# Patient Record
Sex: FEMALE | Race: WHITE | URBAN - METROPOLITAN AREA
[De-identification: names, ages, dates, MRNs, and addresses within clinical notes are randomized per-mention and may not be internally consistent; named-entity substitution may affect disease eponyms.]

---

## 2021-08-31 ENCOUNTER — APPOINTMENT (RX ONLY)
Dept: URBAN - METROPOLITAN AREA CLINIC 51 | Facility: CLINIC | Age: 86
Setting detail: DERMATOLOGY
End: 2021-08-31

## 2021-08-31 PROBLEM — C44.391 OTHER SPECIFIED MALIGNANT NEOPLASM OF SKIN OF NOSE: Status: ACTIVE | Noted: 2021-08-31

## 2021-08-31 PROCEDURE — 99203 OFFICE O/P NEW LOW 30 MIN: CPT

## 2021-08-31 PROCEDURE — ? PRESCRIPTION

## 2021-08-31 PROCEDURE — ? CONSULTATION FOR MOHS SURGERY

## 2021-08-31 RX ORDER — DIAZEPAM 2 MG/1
TABLET ORAL
Qty: 2 | Refills: 0 | COMMUNITY
Start: 2021-08-31

## 2021-08-31 RX ORDER — DIAZEPAM 2 MG/1
TABLET ORAL
Qty: 2 | Refills: 0 | Status: ERX

## 2021-08-31 RX ADMIN — DIAZEPAM: 2 TABLET ORAL at 00:00

## 2021-08-31 NOTE — PROCEDURE: CONSULTATION FOR MOHS SURGERY
Detail Level: Detailed
Size Of Lesion: 1.1
X Size Of Lesion In Cm (Optional): 0.8
Anatomic Location From Referring Provider: HUMBERTO Wills
Name Of The Referring Provider For Procedure: Dr. Do
Incorporate Mauc In Note: Yes

## 2021-09-07 ENCOUNTER — APPOINTMENT (RX ONLY)
Dept: URBAN - METROPOLITAN AREA CLINIC 51 | Facility: CLINIC | Age: 86
Setting detail: DERMATOLOGY
End: 2021-09-07

## 2021-09-07 PROBLEM — C44.391 OTHER SPECIFIED MALIGNANT NEOPLASM OF SKIN OF NOSE: Status: ACTIVE | Noted: 2021-09-07

## 2021-09-07 PROCEDURE — ? MOHS SURGERY

## 2021-09-07 PROCEDURE — 17312 MOHS ADDL STAGE: CPT

## 2021-09-07 PROCEDURE — 17311 MOHS 1 STAGE H/N/HF/G: CPT

## 2021-09-07 PROCEDURE — ? REPAIR NOTE

## 2021-09-07 PROCEDURE — 14060 TIS TRNFR E/N/E/L 10 SQ CM/<: CPT

## 2021-09-07 NOTE — PROCEDURE: REPAIR NOTE
Unique Flap 2 Text: The INSERT NAME OF MUSCLE muscle was meticulously dissected and approximated with the least amount of tension. The subcutaneous tissue was approximated followed by dermis. Carranza points were approximated first followed by epidermal closure. Dressing was applied.

## 2021-09-07 NOTE — HPI: PROCEDURE (MOHS)
Has The Growth Been Previously Biopsied?: has been previously biopsied
Body Location Override (Optional): Left nasal ala (path states left ala)

## 2021-09-07 NOTE — PROCEDURE: MOHS SURGERY
· Lifestyle modifications are recommended including weight loss, improving his diet, and increasing his amount of activity  Graft Donor Site Bandage (Optional-Leave Blank If You Don't Want In Note): Steri-strips and a pressure bandage were applied to the donor site.

## 2021-09-07 NOTE — PROCEDURE: MOHS SURGERY
Body Location Override (Optional - Billing Will Still Be Based On Selected Body Map Location If Applicable): Left nasal ala (path states left ala)

## 2021-09-21 ENCOUNTER — APPOINTMENT (RX ONLY)
Dept: URBAN - METROPOLITAN AREA CLINIC 51 | Facility: CLINIC | Age: 86
Setting detail: DERMATOLOGY
End: 2021-09-21

## 2021-09-21 DIAGNOSIS — Z48.817 ENCOUNTER FOR SURGICAL AFTERCARE FOLLOWING SURGERY ON THE SKIN AND SUBCUTANEOUS TISSUE: ICD-10-CM

## 2021-09-21 PROCEDURE — 99024 POSTOP FOLLOW-UP VISIT: CPT

## 2021-09-21 PROCEDURE — ? POST-OP WOUND CHECK

## 2021-09-21 ASSESSMENT — LOCATION DETAILED DESCRIPTION DERM: LOCATION DETAILED: LEFT NASAL SIDEWALL

## 2021-09-21 ASSESSMENT — LOCATION SIMPLE DESCRIPTION DERM: LOCATION SIMPLE: LEFT NOSE

## 2021-09-21 ASSESSMENT — LOCATION ZONE DERM: LOCATION ZONE: NOSE

## 2021-09-21 NOTE — PROCEDURE: POST-OP WOUND CHECK
Add 08382 Cpt? (Important Note: In 2017 The Use Of 67007 Is Being Tracked By Cms To Determine Future Global Period Reimbursement For Global Periods): yes

## 2021-09-21 NOTE — PROCEDURE: POST-OP WOUND CHECK
Additional Comments: Dr. Schulte explained the importance of massaging are inside and inside of nose to reduce thickening. Pt happy with results. Additional Comments: Dr. Schlute explained the importance of massaging are inside and inside of nose to reduce thickening. Pt happy with results.

## 2022-09-28 NOTE — PROCEDURE: REPAIR NOTE
Referred To Otolaryngology For Closure Text (Leave Blank If You Do Not Want): After obtaining clear surgical margins the patient was sent to otolaryngology for surgical repair.  The patient understands they will receive post-surgical care and follow-up from the referring physician's office. Winlevi Pregnancy And Lactation Text: This medication is considered safe during pregnancy and breastfeeding.

## 2022-11-29 ENCOUNTER — APPOINTMENT (RX ONLY)
Dept: URBAN - METROPOLITAN AREA CLINIC 51 | Facility: CLINIC | Age: 87
Setting detail: DERMATOLOGY
End: 2022-11-29

## 2022-11-29 DIAGNOSIS — D69.2 OTHER NONTHROMBOCYTOPENIC PURPURA: ICD-10-CM | Status: STABLE

## 2022-11-29 DIAGNOSIS — L85.3 XEROSIS CUTIS: ICD-10-CM | Status: STABLE

## 2022-11-29 DIAGNOSIS — L81.4 OTHER MELANIN HYPERPIGMENTATION: ICD-10-CM | Status: STABLE

## 2022-11-29 DIAGNOSIS — Z85.828 PERSONAL HISTORY OF OTHER MALIGNANT NEOPLASM OF SKIN: ICD-10-CM | Status: RESOLVED

## 2022-11-29 DIAGNOSIS — L82.1 OTHER SEBORRHEIC KERATOSIS: ICD-10-CM | Status: STABLE

## 2022-11-29 DIAGNOSIS — Z71.89 OTHER SPECIFIED COUNSELING: ICD-10-CM

## 2022-11-29 DIAGNOSIS — D18.0 HEMANGIOMA: ICD-10-CM | Status: STABLE

## 2022-11-29 DIAGNOSIS — L57.0 ACTINIC KERATOSIS: ICD-10-CM | Status: INADEQUATELY CONTROLLED

## 2022-11-29 DIAGNOSIS — D22 MELANOCYTIC NEVI: ICD-10-CM | Status: STABLE

## 2022-11-29 PROBLEM — D22.5 MELANOCYTIC NEVI OF TRUNK: Status: ACTIVE | Noted: 2022-11-29

## 2022-11-29 PROBLEM — D18.01 HEMANGIOMA OF SKIN AND SUBCUTANEOUS TISSUE: Status: ACTIVE | Noted: 2022-11-29

## 2022-11-29 PROCEDURE — ? COUNSELING

## 2022-11-29 PROCEDURE — 99213 OFFICE O/P EST LOW 20 MIN: CPT | Mod: 25

## 2022-11-29 PROCEDURE — 17003 DESTRUCT PREMALG LES 2-14: CPT

## 2022-11-29 PROCEDURE — 17000 DESTRUCT PREMALG LESION: CPT

## 2022-11-29 PROCEDURE — ? LIQUID NITROGEN

## 2022-11-29 PROCEDURE — ? SUNSCREEN RECOMMENDATIONS

## 2022-11-29 PROCEDURE — ? TREATMENT REGIMEN

## 2022-11-29 PROCEDURE — ? MEDICATION COUNSELING

## 2022-11-29 PROCEDURE — ? FULL BODY SKIN EXAM

## 2022-11-29 PROCEDURE — ? PRESCRIPTION

## 2022-11-29 PROCEDURE — ? EDUCATIONAL RESOURCES PROVIDED

## 2022-11-29 PROCEDURE — ? PRESCRIPTION MEDICATION MANAGEMENT

## 2022-11-29 ASSESSMENT — LOCATION DETAILED DESCRIPTION DERM
LOCATION DETAILED: LEFT MID-UPPER BACK
LOCATION DETAILED: EPIGASTRIC SKIN
LOCATION DETAILED: LEFT PROXIMAL DORSAL FOREARM
LOCATION DETAILED: LEFT DISTAL DORSAL FOREARM
LOCATION DETAILED: LEFT LATERAL ABDOMEN
LOCATION DETAILED: RIGHT INFERIOR MEDIAL UPPER BACK
LOCATION DETAILED: RIGHT CENTRAL MALAR CHEEK
LOCATION DETAILED: RIGHT PROXIMAL DORSAL FOREARM
LOCATION DETAILED: RIGHT MEDIAL SUPERIOR CHEST

## 2022-11-29 ASSESSMENT — LOCATION ZONE DERM
LOCATION ZONE: ARM
LOCATION ZONE: TRUNK
LOCATION ZONE: FACE

## 2022-11-29 ASSESSMENT — LOCATION SIMPLE DESCRIPTION DERM
LOCATION SIMPLE: ABDOMEN
LOCATION SIMPLE: RIGHT CHEEK
LOCATION SIMPLE: RIGHT UPPER BACK
LOCATION SIMPLE: LEFT UPPER BACK
LOCATION SIMPLE: CHEST
LOCATION SIMPLE: LEFT FOREARM
LOCATION SIMPLE: RIGHT FOREARM

## 2022-11-29 NOTE — PROCEDURE: LIQUID NITROGEN
Render Note In Bullet Format When Appropriate: No
Show Applicator Variable?: Yes
Post-Care Instructions: I reviewed with the patient in detail post-care instructions. Patient is to wear sunprotection, and avoid picking at any of the treated lesions. Pt may apply Vaseline to crusted or scabbing areas.
Consent: The patient's consent was obtained including but not limited to risks of crusting, scabbing, blistering, scarring, darker or lighter pigmentary change, recurrence, incomplete removal and infection.
Detail Level: Simple
Number Of Freeze-Thaw Cycles: 1 freeze-thaw cycle
Duration Of Freeze Thaw-Cycle (Seconds): 0

## 2022-11-29 NOTE — HPI: FULL BODY SKIN EXAMINATION
What Type Of Note Output Would You Prefer (Optional)?: Bullet Format
What Is The Reason For Today's Visit?: Full Body Skin Examination
What Is The Reason For Today's Visit? (Being Monitored For X): concerning skin lesions on an annual basis
Additional History: Patient concerned of bumps under nose.

## 2022-11-29 NOTE — PROCEDURE: MEDICATION COUNSELING
Xeltysonz Pregnancy And Lactation Text: This medication is Pregnancy Category D and is not considered safe during pregnancy.  The risk during breast feeding is also uncertain.

## 2022-11-29 NOTE — PROCEDURE: PRESCRIPTION MEDICATION MANAGEMENT
Detail Level: Zone
Render In Strict Bullet Format?: No
Initiate Treatment: 5FU/Calcipitriene QHS x5 days

## 2022-11-29 NOTE — PROCEDURE: MEDICATION COUNSELING
Price (Do Not Change): 0.00 Instructions: This plan will send the code FBSE to the PM system.  DO NOT or CHANGE the price. Detail Level: Simple Dupixent Counseling: I discussed with the patient the risks of dupilumab including but not limited to eye infection and irritation, cold sores, injection site reactions, worsening of asthma, allergic reactions and increased risk of parasitic infection.  Live vaccines should be avoided while taking dupilumab. Dupilumab will also interact with certain medications such as warfarin and cyclosporine. The patient understands that monitoring is required and they must alert us or the primary physician if symptoms of infection or other concerning signs are noted.

## 2022-11-29 NOTE — PROCEDURE: MEDICATION COUNSELING
electronic Hydroxychloroquine Pregnancy And Lactation Text: This medication has been shown to cause fetal harm but it isn't assigned a Pregnancy Risk Category. There are small amounts excreted in breast milk.

## 2023-10-03 NOTE — PROCEDURE: MEDICATION COUNSELING
Diabetes   E11.9  High cholesterol   E78.00  Asthma   J45.909   Rinvoq Pregnancy And Lactation Text: Based on animal studies, Rinvoq may cause embryo-fetal harm when administered to pregnant women.  The medication should not be used in pregnancy.  Breastfeeding is not recommended during treatment and for 6 days after the last dose.

## 2023-12-18 ENCOUNTER — APPOINTMENT (RX ONLY)
Dept: URBAN - METROPOLITAN AREA CLINIC 51 | Facility: CLINIC | Age: 88
Setting detail: DERMATOLOGY
End: 2023-12-18

## 2023-12-18 DIAGNOSIS — L82.1 OTHER SEBORRHEIC KERATOSIS: ICD-10-CM | Status: STABLE

## 2023-12-18 DIAGNOSIS — Z71.89 OTHER SPECIFIED COUNSELING: ICD-10-CM

## 2023-12-18 DIAGNOSIS — D485 NEOPLASM OF UNCERTAIN BEHAVIOR OF SKIN: ICD-10-CM | Status: INADEQUATELY CONTROLLED

## 2023-12-18 DIAGNOSIS — D22 MELANOCYTIC NEVI: ICD-10-CM | Status: STABLE

## 2023-12-18 DIAGNOSIS — Z85.828 PERSONAL HISTORY OF OTHER MALIGNANT NEOPLASM OF SKIN: ICD-10-CM | Status: RESOLVED

## 2023-12-18 DIAGNOSIS — L57.0 ACTINIC KERATOSIS: ICD-10-CM | Status: INADEQUATELY CONTROLLED

## 2023-12-18 DIAGNOSIS — L81.4 OTHER MELANIN HYPERPIGMENTATION: ICD-10-CM | Status: STABLE

## 2023-12-18 PROBLEM — D22.5 MELANOCYTIC NEVI OF TRUNK: Status: ACTIVE | Noted: 2023-12-18

## 2023-12-18 PROBLEM — D48.5 NEOPLASM OF UNCERTAIN BEHAVIOR OF SKIN: Status: ACTIVE | Noted: 2023-12-18

## 2023-12-18 PROCEDURE — 17000 DESTRUCT PREMALG LESION: CPT | Mod: 59

## 2023-12-18 PROCEDURE — 99213 OFFICE O/P EST LOW 20 MIN: CPT | Mod: 25

## 2023-12-18 PROCEDURE — 11102 TANGNTL BX SKIN SINGLE LES: CPT

## 2023-12-18 PROCEDURE — ? SUNSCREEN RECOMMENDATIONS

## 2023-12-18 PROCEDURE — ? LIQUID NITROGEN

## 2023-12-18 PROCEDURE — ? BIOPSY BY SHAVE METHOD

## 2023-12-18 PROCEDURE — ? EDUCATIONAL RESOURCES PROVIDED

## 2023-12-18 PROCEDURE — ? TREATMENT REGIMEN

## 2023-12-18 PROCEDURE — ? COUNSELING

## 2023-12-18 ASSESSMENT — LOCATION SIMPLE DESCRIPTION DERM
LOCATION SIMPLE: LEFT UPPER BACK
LOCATION SIMPLE: UPPER LIP
LOCATION SIMPLE: CHEST
LOCATION SIMPLE: ABDOMEN

## 2023-12-18 ASSESSMENT — LOCATION DETAILED DESCRIPTION DERM
LOCATION DETAILED: RIGHT MEDIAL SUPERIOR CHEST
LOCATION DETAILED: PHILTRUM
LOCATION DETAILED: LEFT MID-UPPER BACK
LOCATION DETAILED: EPIGASTRIC SKIN
LOCATION DETAILED: LEFT MEDIAL UPPER BACK

## 2023-12-18 ASSESSMENT — LOCATION ZONE DERM
LOCATION ZONE: TRUNK
LOCATION ZONE: LIP

## 2023-12-18 NOTE — HPI: EVALUATION OF SKIN LESION(S)
Hpi Title: Evaluation of Skin Lesions
Additional History: Patient concerned of little bumps above lips.

## 2023-12-18 NOTE — PROCEDURE: LIQUID NITROGEN
Number Of Freeze-Thaw Cycles: 1 freeze-thaw cycle
Show Aperture Variable?: Yes
Duration Of Freeze Thaw-Cycle (Seconds): 0
Render Note In Bullet Format When Appropriate: No
Detail Level: Simple
Post-Care Instructions: I reviewed with the patient in detail post-care instructions. Patient is to wear sunprotection, and avoid picking at any of the treated lesions. Pt may apply Vaseline to crusted or scabbing areas.
Consent: The patient's consent was obtained including but not limited to risks of crusting, scabbing, blistering, scarring, darker or lighter pigmentary change, recurrence, incomplete removal and infection.

## 2023-12-18 NOTE — PROCEDURE: BIOPSY BY SHAVE METHOD
Detail Level: Detailed
Depth Of Biopsy: dermis
Was A Bandage Applied: Yes
Size Of Lesion In Cm: 0.3
X Size Of Lesion In Cm: 0
Biopsy Type: H and E
Biopsy Method: Dermablade
Anesthesia Type: 1% lidocaine with epinephrine
Anesthesia Volume In Cc: 0.5
Hemostasis: Ann's
Wound Care: Bacitracin
Dressing: bandage
Destruction After The Procedure: No
Type Of Destruction Used: Curettage
Curettage Text: The wound bed was treated with curettage after the biopsy was performed.
Cryotherapy Text: The wound bed was treated with cryotherapy after the biopsy was performed.
Electrodesiccation Text: The wound bed was treated with electrodesiccation after the biopsy was performed.
Electrodesiccation And Curettage Text: The wound bed was treated with electrodesiccation and curettage after the biopsy was performed.
Silver Nitrate Text: The wound bed was treated with silver nitrate after the biopsy was performed.
Lab: 6
Lab Facility: [id number]
Consent: Written consent was obtained and risks were reviewed including but not limited to scarring, infection, bleeding, scabbing, incomplete removal, nerve damage and allergy to anesthesia.
Post-Care Instructions: I reviewed with the patient in detail post-care instructions. Patient is to keep the biopsy site dry overnight, and then apply bacitracin twice daily until healed. Patient may apply hydrogen peroxide soaks to remove any crusting.
Notification Instructions: Patient will be notified of biopsy results. However, patient instructed to call the office if not contacted within 2 weeks.
Billing Type: Third-Party Bill
Information: Selecting Yes will display possible errors in your note based on the variables you have selected. This validation is only offered as a suggestion for you. PLEASE NOTE THAT THE VALIDATION TEXT WILL BE REMOVED WHEN YOU FINALIZE YOUR NOTE. IF YOU WANT TO FAX A PRELIMINARY NOTE YOU WILL NEED TO TOGGLE THIS TO 'NO' IF YOU DO NOT WANT IT IN YOUR FAXED NOTE.

## 2024-03-05 NOTE — PROCEDURE: MEDICATION COUNSELING
colchicine (COLCRYS) 0.6 MG tablet    Simponi Pregnancy And Lactation Text: The risk during pregnancy and breastfeeding is uncertain with this medication.

## 2024-04-16 NOTE — PROCEDURE: MEDICATION COUNSELING
[facility] Alert Outreach Telephone Call Attempt     Patient is being outreached by the [facility] for a clinical alert from the [facility] monitoring program.     Call Attempt Date: 4/16/2024    Call Attempt: Second Attempt   Hydroxychloroquine Counseling:  I discussed with the patient that a baseline ophthalmologic exam is needed at the start of therapy and every year thereafter while on therapy. A CBC may also be warranted for monitoring.  The side effects of this medication were discussed with the patient, including but not limited to agranulocytosis, aplastic anemia, seizures, rashes, retinopathy, and liver toxicity. Patient instructed to call the office should any adverse effect occur.  The patient verbalized understanding of the proper use and possible adverse effects of Plaquenil.  All the patient's questions and concerns were addressed.
